# Patient Record
Sex: MALE | Race: WHITE | NOT HISPANIC OR LATINO | Employment: UNEMPLOYED | URBAN - METROPOLITAN AREA
[De-identification: names, ages, dates, MRNs, and addresses within clinical notes are randomized per-mention and may not be internally consistent; named-entity substitution may affect disease eponyms.]

---

## 2024-11-28 ENCOUNTER — HOSPITAL ENCOUNTER (EMERGENCY)
Facility: HOSPITAL | Age: 59
Discharge: HOME | End: 2024-11-29
Attending: STUDENT IN AN ORGANIZED HEALTH CARE EDUCATION/TRAINING PROGRAM
Payer: COMMERCIAL

## 2024-11-28 ENCOUNTER — APPOINTMENT (OUTPATIENT)
Dept: RADIOLOGY | Facility: HOSPITAL | Age: 59
End: 2024-11-28
Payer: COMMERCIAL

## 2024-11-28 ENCOUNTER — HOSPITAL ENCOUNTER (OUTPATIENT)
Dept: CARDIOLOGY | Facility: HOSPITAL | Age: 59
Discharge: HOME | End: 2024-11-28
Payer: COMMERCIAL

## 2024-11-28 DIAGNOSIS — R07.9 CHEST PAIN, UNSPECIFIED TYPE: Primary | ICD-10-CM

## 2024-11-28 LAB
ALBUMIN SERPL BCP-MCNC: 4.2 G/DL (ref 3.4–5)
ALP SERPL-CCNC: 53 U/L (ref 33–120)
ALT SERPL W P-5'-P-CCNC: 8 U/L (ref 10–52)
ANION GAP SERPL CALC-SCNC: 14 MMOL/L (ref 10–20)
APTT PPP: 32 SECONDS (ref 27–38)
AST SERPL W P-5'-P-CCNC: 20 U/L (ref 9–39)
BASOPHILS # BLD AUTO: 0.06 X10*3/UL (ref 0–0.1)
BASOPHILS NFR BLD AUTO: 1 %
BILIRUB SERPL-MCNC: 0.7 MG/DL (ref 0–1.2)
BUN SERPL-MCNC: 17 MG/DL (ref 6–23)
CALCIUM SERPL-MCNC: 8.6 MG/DL (ref 8.6–10.3)
CARDIAC TROPONIN I PNL SERPL HS: 19 NG/L (ref 0–20)
CARDIAC TROPONIN I PNL SERPL HS: 19 NG/L (ref 0–20)
CHLORIDE SERPL-SCNC: 103 MMOL/L (ref 98–107)
CO2 SERPL-SCNC: 25 MMOL/L (ref 21–32)
CREAT SERPL-MCNC: 1.55 MG/DL (ref 0.5–1.3)
D DIMER PPP FEU-MCNC: 783 NG/ML FEU
EGFRCR SERPLBLD CKD-EPI 2021: 51 ML/MIN/1.73M*2
EOSINOPHIL # BLD AUTO: 0.14 X10*3/UL (ref 0–0.7)
EOSINOPHIL NFR BLD AUTO: 2.3 %
ERYTHROCYTE [DISTWIDTH] IN BLOOD BY AUTOMATED COUNT: 12.6 % (ref 11.5–14.5)
GLUCOSE SERPL-MCNC: 121 MG/DL (ref 74–99)
HCT VFR BLD AUTO: 43.2 % (ref 41–52)
HGB BLD-MCNC: 15.4 G/DL (ref 13.5–17.5)
IMM GRANULOCYTES # BLD AUTO: 0.04 X10*3/UL (ref 0–0.7)
IMM GRANULOCYTES NFR BLD AUTO: 0.7 % (ref 0–0.9)
INR PPP: 1 (ref 0.9–1.1)
LYMPHOCYTES # BLD AUTO: 1.45 X10*3/UL (ref 1.2–4.8)
LYMPHOCYTES NFR BLD AUTO: 24 %
MAGNESIUM SERPL-MCNC: 1.91 MG/DL (ref 1.6–2.4)
MCH RBC QN AUTO: 30.9 PG (ref 26–34)
MCHC RBC AUTO-ENTMCNC: 35.6 G/DL (ref 32–36)
MCV RBC AUTO: 87 FL (ref 80–100)
MONOCYTES # BLD AUTO: 0.58 X10*3/UL (ref 0.1–1)
MONOCYTES NFR BLD AUTO: 9.6 %
NEUTROPHILS # BLD AUTO: 3.78 X10*3/UL (ref 1.2–7.7)
NEUTROPHILS NFR BLD AUTO: 62.4 %
NRBC BLD-RTO: 0 /100 WBCS (ref 0–0)
PLATELET # BLD AUTO: 264 X10*3/UL (ref 150–450)
POTASSIUM SERPL-SCNC: 4.1 MMOL/L (ref 3.5–5.3)
PROT SERPL-MCNC: 6.7 G/DL (ref 6.4–8.2)
PROTHROMBIN TIME: 11.5 SECONDS (ref 9.8–12.8)
RBC # BLD AUTO: 4.98 X10*6/UL (ref 4.5–5.9)
SODIUM SERPL-SCNC: 138 MMOL/L (ref 136–145)
WBC # BLD AUTO: 6.1 X10*3/UL (ref 4.4–11.3)

## 2024-11-28 PROCEDURE — 84484 ASSAY OF TROPONIN QUANT: CPT | Performed by: STUDENT IN AN ORGANIZED HEALTH CARE EDUCATION/TRAINING PROGRAM

## 2024-11-28 PROCEDURE — 83735 ASSAY OF MAGNESIUM: CPT | Performed by: STUDENT IN AN ORGANIZED HEALTH CARE EDUCATION/TRAINING PROGRAM

## 2024-11-28 PROCEDURE — 36415 COLL VENOUS BLD VENIPUNCTURE: CPT | Performed by: STUDENT IN AN ORGANIZED HEALTH CARE EDUCATION/TRAINING PROGRAM

## 2024-11-28 PROCEDURE — 85379 FIBRIN DEGRADATION QUANT: CPT | Performed by: STUDENT IN AN ORGANIZED HEALTH CARE EDUCATION/TRAINING PROGRAM

## 2024-11-28 PROCEDURE — 85025 COMPLETE CBC W/AUTO DIFF WBC: CPT | Performed by: STUDENT IN AN ORGANIZED HEALTH CARE EDUCATION/TRAINING PROGRAM

## 2024-11-28 PROCEDURE — 85610 PROTHROMBIN TIME: CPT | Performed by: STUDENT IN AN ORGANIZED HEALTH CARE EDUCATION/TRAINING PROGRAM

## 2024-11-28 PROCEDURE — 80053 COMPREHEN METABOLIC PANEL: CPT | Performed by: STUDENT IN AN ORGANIZED HEALTH CARE EDUCATION/TRAINING PROGRAM

## 2024-11-28 PROCEDURE — 71045 X-RAY EXAM CHEST 1 VIEW: CPT

## 2024-11-28 PROCEDURE — 2500000001 HC RX 250 WO HCPCS SELF ADMINISTERED DRUGS (ALT 637 FOR MEDICARE OP): Performed by: STUDENT IN AN ORGANIZED HEALTH CARE EDUCATION/TRAINING PROGRAM

## 2024-11-28 PROCEDURE — 99285 EMERGENCY DEPT VISIT HI MDM: CPT | Mod: 25

## 2024-11-28 PROCEDURE — 93005 ELECTROCARDIOGRAM TRACING: CPT

## 2024-11-28 PROCEDURE — 71045 X-RAY EXAM CHEST 1 VIEW: CPT | Mod: FOREIGN READ | Performed by: RADIOLOGY

## 2024-11-28 PROCEDURE — 85730 THROMBOPLASTIN TIME PARTIAL: CPT | Performed by: STUDENT IN AN ORGANIZED HEALTH CARE EDUCATION/TRAINING PROGRAM

## 2024-11-28 PROCEDURE — 71275 CT ANGIOGRAPHY CHEST: CPT

## 2024-11-28 RX ORDER — NITROGLYCERIN 0.4 MG/1
0.4 TABLET SUBLINGUAL EVERY 5 MIN PRN
Status: DISCONTINUED | OUTPATIENT
Start: 2024-11-28 | End: 2024-11-29 | Stop reason: HOSPADM

## 2024-11-28 RX ORDER — NAPROXEN SODIUM 220 MG/1
324 TABLET, FILM COATED ORAL ONCE
Status: DISCONTINUED | OUTPATIENT
Start: 2024-11-28 | End: 2024-11-28

## 2024-11-28 RX ADMIN — NITROGLYCERIN 0.4 MG: 0.4 TABLET SUBLINGUAL at 21:51

## 2024-11-28 ASSESSMENT — PAIN SCALES - GENERAL
PAINLEVEL_OUTOF10: 6
PAINLEVEL_OUTOF10: 5 - MODERATE PAIN

## 2024-11-28 ASSESSMENT — LIFESTYLE VARIABLES
EVER HAD A DRINK FIRST THING IN THE MORNING TO STEADY YOUR NERVES TO GET RID OF A HANGOVER: NO
HAVE PEOPLE ANNOYED YOU BY CRITICIZING YOUR DRINKING: NO
EVER FELT BAD OR GUILTY ABOUT YOUR DRINKING: NO
HAVE YOU EVER FELT YOU SHOULD CUT DOWN ON YOUR DRINKING: NO
TOTAL SCORE: 0

## 2024-11-28 ASSESSMENT — COLUMBIA-SUICIDE SEVERITY RATING SCALE - C-SSRS
6. HAVE YOU EVER DONE ANYTHING, STARTED TO DO ANYTHING, OR PREPARED TO DO ANYTHING TO END YOUR LIFE?: NO
2. HAVE YOU ACTUALLY HAD ANY THOUGHTS OF KILLING YOURSELF?: NO
1. IN THE PAST MONTH, HAVE YOU WISHED YOU WERE DEAD OR WISHED YOU COULD GO TO SLEEP AND NOT WAKE UP?: NO

## 2024-11-28 ASSESSMENT — PAIN DESCRIPTION - PAIN TYPE: TYPE: ACUTE PAIN

## 2024-11-28 ASSESSMENT — PAIN DESCRIPTION - DESCRIPTORS: DESCRIPTORS: SQUEEZING

## 2024-11-28 ASSESSMENT — PAIN DESCRIPTION - LOCATION: LOCATION: CHEST

## 2024-11-28 ASSESSMENT — PAIN - FUNCTIONAL ASSESSMENT: PAIN_FUNCTIONAL_ASSESSMENT: 0-10

## 2024-11-29 ENCOUNTER — APPOINTMENT (OUTPATIENT)
Dept: CARDIOLOGY | Facility: HOSPITAL | Age: 59
End: 2024-11-29
Payer: COMMERCIAL

## 2024-11-29 VITALS
RESPIRATION RATE: 18 BRPM | BODY MASS INDEX: 28.31 KG/M2 | OXYGEN SATURATION: 96 % | HEIGHT: 72 IN | SYSTOLIC BLOOD PRESSURE: 136 MMHG | WEIGHT: 209 LBS | DIASTOLIC BLOOD PRESSURE: 78 MMHG | TEMPERATURE: 97.2 F | HEART RATE: 61 BPM

## 2024-11-29 LAB
ATRIAL RATE: 63 BPM
P AXIS: 60 DEGREES
P OFFSET: 183 MS
P ONSET: 141 MS
PR INTERVAL: 158 MS
Q ONSET: 220 MS
QRS COUNT: 10 BEATS
QRS DURATION: 94 MS
QT INTERVAL: 392 MS
QTC CALCULATION(BAZETT): 401 MS
QTC FREDERICIA: 398 MS
R AXIS: 4 DEGREES
T AXIS: -36 DEGREES
T OFFSET: 416 MS
VENTRICULAR RATE: 63 BPM

## 2024-11-29 PROCEDURE — 2550000001 HC RX 255 CONTRASTS: Performed by: STUDENT IN AN ORGANIZED HEALTH CARE EDUCATION/TRAINING PROGRAM

## 2024-11-29 PROCEDURE — 71275 CT ANGIOGRAPHY CHEST: CPT | Performed by: SURGERY

## 2024-11-29 RX ADMIN — IOHEXOL 75 ML: 350 INJECTION, SOLUTION INTRAVENOUS at 00:11

## 2024-11-29 ASSESSMENT — HEART SCORE
RISK FACTORS: 1-2 RISK FACTORS
ECG: NON-SPECIFIC REPOLARIZATION DISTURBANCE
HISTORY: SLIGHTLY SUSPICIOUS
HEART SCORE: 3
TROPONIN: LESS THAN OR EQUAL TO NORMAL LIMIT
AGE: 45-64

## 2024-11-29 NOTE — ED PROVIDER NOTES
HPI   Chief Complaint   Patient presents with    Chest Pain    Hypertension     PT. ARRIVED VIA PRIVATE CAR, RIDE PROVIDED, TO ED FROM HOME FOR CP/HTN. PT. STATES X2WKS AGO, BROUGHT MOTHER IN IF CP, HAS FELT ANXIOUS SINCE. PT. STATES HX OF ANXIETY. PT. STATES CP STARTED X2WK AGO IS LT SIDED, RADIATES DOWN LT ARM, STARTED TODAY WHILE WALKING AROUND GARAGE, TOOK BABY ASA, W/ SOME RELIEF. PT. STATES SOB, AND LIGHT HEADEDNESS W/ CP. PT. EKG READING W/ HR OF 65. PT. BREATHING UNLABORED, SPEAKING IN FULL SENTENCES, B/L LUNG SOUNDS CLEAR, PULSE OX READING 96% ON RA. PT. IS HYPERTENSIVE IN       The patient is a 59-year-old male with past medical history as below who presents today for evaluation of chest pain.  Located on the left side.  Coming and going now for 2 weeks severe time radiates down the left arm.  No fevers nausea vomiting no personal history of MI or CVA.  Non-smoker.  Remote history of smoking over 10 years ago.  No diagnosed hypertension hyperlipidemia or diabetes.              Patient History   Past Medical History:   Diagnosis Date    Other specified health status     No pertinent past medical history     Past Surgical History:   Procedure Laterality Date    CT ANGIO NECK  11/23/2022    CT NECK ANGIO W AND WO IV CONTRAST 11/23/2022 [facility]CTOR [facility]     No family history on file.  Social History     Tobacco Use    Smoking status: Former     Types: Cigarettes    Smokeless tobacco: Never   Vaping Use    Vaping status: Never Used   Substance Use Topics    Alcohol use: Never    Drug use: Never       Physical Exam   ED Triage Vitals [11/28/24 2101]   Temperature Heart Rate Respirations BP   36.2 °C (97.2 °F) 69 20 (!) 234/109      Pulse Ox Temp Source Heart Rate Source Patient Position   96 % Temporal Monitor Sitting      BP Location FiO2 (%)     Right arm --       Physical Exam  Vitals and nursing note reviewed.   Constitutional:       Appearance: Normal appearance.   HENT:      Head: Normocephalic and  atraumatic.      Right Ear: External ear normal.      Left Ear: External ear normal.      Nose: Nose normal.      Mouth/Throat:      Mouth: Mucous membranes are moist.   Cardiovascular:      Rate and Rhythm: Normal rate and regular rhythm.      Pulses: Normal pulses.           Radial pulses are 2+ on the right side and 2+ on the left side.      Heart sounds: Normal heart sounds.   Pulmonary:      Effort: Pulmonary effort is normal.      Breath sounds: Normal breath sounds.   Abdominal:      General: Abdomen is flat. There is no distension.      Palpations: Abdomen is soft.      Tenderness: There is no abdominal tenderness. There is no guarding or rebound.   Musculoskeletal:         General: No deformity.   Skin:     General: Skin is warm.   Neurological:      General: No focal deficit present.      Mental Status: He is alert and oriented to person, place, and time. Mental status is at baseline.           ED Course & [facility]   ED Course as of 11/29/24 0344   Thu Nov 28, 2024 2125 ECG 12 lead  EKG shows normal sinus rhythm with ST depression in 2 3 aVF with some inversion in 3 and aVF some lateral depression as well.  More pronounced than previous EKG [SE]      ED Course User Index  [SE] Refugio Tobias MD         Diagnoses as of 11/29/24 0344   Chest pain, unspecified type                 No data recorded     Keven Coma Scale Score: 15 (11/28/24 2208 : Rupali Voss RN) HEART Score: 3 (11/29/24 0339 : Refugio Tobias MD)                         Medical Decision Making  Patient presents for evaluation of chest pain.  Labs and EKG were obtained.  EKG appears similar to prior.  Labs showed flat troponins.  Both negative.  CTA of the chest does not reveal any blood clots or any other acute findings.  These findings were discussed with the patient need for further evaluation and management.  Recommend cardiology follow-up.  Heart score 3    I completed a HEART Score to screen for Major Adverse Cardiac Event (MACE) in this  patient. The evidence indicates that the patient is low risk for MACE and this is consistent with my clinical intuition.  The risk of further workup or hospitalization for MACE is likely higher than the risk of the patient having a MACE. It is, therefore, in the patient's best interest not to do additional emergent testing or to be hospitalized for MACE.  I have discussed with the patient my clinical impression and the result of the HEART Score to screen for MACE, as well as the  risks of further testing and hospitalization. The HEART Score shows that the risk for MACE is less than 2%      Amount and/or Complexity of Data Reviewed  Labs: ordered.  Radiology: ordered.  ECG/medicine tests: ordered and independent interpretation performed.    Risk  Prescription drug management.  Decision regarding hospitalization.        Procedure  Procedures     Refugio Tobias MD  11/29/24 0344

## 2024-12-09 ENCOUNTER — OFFICE VISIT (OUTPATIENT)
Dept: CARDIOLOGY | Facility: CLINIC | Age: 59
End: 2024-12-09
Payer: COMMERCIAL

## 2024-12-09 VITALS
WEIGHT: 212 LBS | DIASTOLIC BLOOD PRESSURE: 85 MMHG | BODY MASS INDEX: 28.71 KG/M2 | SYSTOLIC BLOOD PRESSURE: 134 MMHG | OXYGEN SATURATION: 98 % | HEART RATE: 69 BPM | HEIGHT: 72 IN

## 2024-12-09 DIAGNOSIS — I25.118 CORONARY ARTERY DISEASE INVOLVING NATIVE CORONARY ARTERY OF NATIVE HEART WITH OTHER FORM OF ANGINA PECTORIS: ICD-10-CM

## 2024-12-09 DIAGNOSIS — R73.9 BLOOD GLUCOSE ELEVATED: Primary | ICD-10-CM

## 2024-12-09 DIAGNOSIS — R07.9 CHEST PAIN, UNSPECIFIED TYPE: ICD-10-CM

## 2024-12-09 PROBLEM — I25.10 ATHEROSCLEROSIS OF NATIVE CORONARY ARTERY OF NATIVE HEART: Status: ACTIVE | Noted: 2024-12-09

## 2024-12-09 PROBLEM — I25.10 CORONARY ARTERY DISEASE INVOLVING NATIVE CORONARY ARTERY OF NATIVE HEART: Status: ACTIVE | Noted: 2024-12-09

## 2024-12-09 PROCEDURE — 3008F BODY MASS INDEX DOCD: CPT | Performed by: INTERNAL MEDICINE

## 2024-12-09 PROCEDURE — 1036F TOBACCO NON-USER: CPT | Performed by: INTERNAL MEDICINE

## 2024-12-09 PROCEDURE — 93005 ELECTROCARDIOGRAM TRACING: CPT | Performed by: INTERNAL MEDICINE

## 2024-12-09 PROCEDURE — 99214 OFFICE O/P EST MOD 30 MIN: CPT | Performed by: INTERNAL MEDICINE

## 2024-12-09 PROCEDURE — 93010 ELECTROCARDIOGRAM REPORT: CPT | Performed by: INTERNAL MEDICINE

## 2024-12-09 PROCEDURE — 99204 OFFICE O/P NEW MOD 45 MIN: CPT | Performed by: INTERNAL MEDICINE

## 2024-12-09 RX ORDER — OMEPRAZOLE 20 MG/1
20 CAPSULE, DELAYED RELEASE ORAL
COMMUNITY

## 2024-12-09 ASSESSMENT — PAIN SCALES - GENERAL: PAINLEVEL_OUTOF10: 7

## 2024-12-09 NOTE — PROGRESS NOTES
Name : Gopal Rivas    : 1965   MRN : [id number]   ENC Date : 24     Reason for visit: Chest pain/CAD    Assessment and Plan:  Coronary artery disease: Patient has remote history of mild CAD.  Symptoms seem more consistent with anxiety but he has multiple risk factors for worsening CAD including untreated lipids, tobacco history and family history of atherosclerosis.  EKG is subtly abnormal with inferior and lateral ST and T wave abnormalities.  I consider going directly to a coronary CTA but given his waxing and waning renal function I do not think IV contrast would be wise at this point without rechecking renal function.  Therefore we will start with a stress echocardiogram.  This has the advantage of no IV contrast and we also will be able to assess LV function.  He briefly took atorvastatin in the past but had some atypical side effects.  Will readdress statin after we get repeat lipid panel high-sensitivity CRP and LP(a) laboratory so we can review all of this and make a better case for starting statin therapy.  His coronary calcification alone and known CAD would be an indication for statin therapy.  Will hold off until we get the lab work back to have a good baseline to work from.  Elevated blood glucose: Patient will be establishing with primary care probably March of next year.  Will order hemoglobin A1c in the interim since we are already getting blood work.  Former tobacco use: Congratulated patient on discontinuing smoking almost 11 years ago now.  Elevated blood pressure without diagnosis of hypertension: Patient blood pressure was greatly elevated at the time of his emergency room visit.  It probably was situational in nature.  It sounds as if he has had anxiety related elevated blood pressures in the past.  This may explain some of his elevated creatinine however.  Blood pressure was acceptable today.  Will repeat at the time of his stress test.  CKD: As described in HPI waxing and waning  renal insufficiency.  Will repeat creatinine with lab work as described above.  Disp: Determine follow-up after laboratories and stress test      HPI:  Patient seen by myself for the first time today.  He apparently has a history of mild coronary disease having had a cardiac catheterization approximately 20 years ago showing a 40% blockage of an unknown vessel.  It was ultimately determined he was having a severe panic attack at the time.  In the intervening years he has not had consistent medical follow-up if at all.  He was a former smoker but quit approximately 11 years ago.  He was seen at [place] 2 years ago for atypical chest pain.  Cardiology saw him and recommended no further testing.  He was then seen at [facility] approximately 2 weeks ago for another episode of chest discomfort.  Interestingly he stated that he took a aspirin and within a few minutes he states his symptoms improved by 80%.  Cardiac enzymes were negative.  EKG reportedly showed ST segment depression in leads II, III and aVF.  CTA of the chest was negative for pulmonary embolism but did show moderate coronary calcification.  He was ultimately discharged home.  Blood pressure was extremely elevated at 234/109.  He was not sent home on blood pressure medication.  His blood pressure is documented as coming down to 136/78 prior to discharge.  I do not believe he was given any medication specifically to treat the hypertension.  In reviewing his laboratories his creatinine was elevated at 1.55.  His creatinine has gone up and down in the past in 2019 being normal at 0.92 and in August 2022 elevated to 1.68 and then again normalized in November 2022 at 1.0.  Patient was unaware of any of these lab values or if he had any prior kidney issues.      Problem List:   Patient Active Problem List   Diagnosis    Atherosclerosis of native coronary artery of native heart    Blood glucose elevated    Coronary artery disease involving native coronary artery of  native heart        Meds:   Current Outpatient Medications on File Prior to Visit   Medication Sig Dispense Refill    omeprazole (PriLOSEC) 20 mg DR capsule Take 1 capsule (20 mg) by mouth once daily in the morning. Take before meals.       No current facility-administered medications on file prior to visit.       All: No Known Allergies    Fam Hx:   Family History   Problem Relation Name Age of Onset    Stroke Mother      Coronary artery disease Father         Soc Hx:   Social History     Socioeconomic History    Marital status:      Spouse name: Not on file    Number of children: Not on file    Years of education: Not on file    Highest education level: Not on file   Occupational History    Not on file   Tobacco Use    Smoking status: Former     Types: Cigarettes    Smokeless tobacco: Never   Vaping Use    Vaping status: Never Used   Substance and Sexual Activity    Alcohol use: Yes     Alcohol/week: 5.0 standard drinks of alcohol     Types: 5 Cans of beer per week    Drug use: Not Currently     Types: Marijuana    Sexual activity: Not on file   Other Topics Concern    Not on file   Social History Narrative    Not on file     Social Drivers of Health     Financial Resource Strain: Not on file   Food Insecurity: Not on file   Transportation Needs: Not on file   Physical Activity: Not on file   Stress: Not on file   Social Connections: Not on file   Intimate Partner Violence: Not on file   Housing Stability: Not on file       VS: /85 (BP Location: Right arm, Patient Position: Sitting, BP Cuff Size: Adult)   Pulse 69   Ht 1.829 m (6')   Wt 96.2 kg (212 lb)   SpO2 98%   BMI 28.75 kg/m²      Physical Exam  Vitals reviewed.   Constitutional:       Appearance: Normal appearance.   Eyes:      Pupils: Pupils are equal, round, and reactive to light.   Neck:      Vascular: No JVD.   Cardiovascular:      Rate and Rhythm: Normal rate and regular rhythm.      Pulses: Normal pulses.      Heart sounds: No murmur  heard.     No gallop.   Pulmonary:      Effort: No respiratory distress.      Breath sounds: No wheezing or rales.   Abdominal:      General: Abdomen is flat. There is no distension.      Palpations: Abdomen is soft.   Musculoskeletal:         General: No swelling.      Right lower leg: No edema.      Left lower leg: No edema.   Neurological:      General: No focal deficit present.      Mental Status: He is alert.   Psychiatric:         Mood and Affect: Mood normal.            Encounter Date: 24   ECG 12 lead   Result Value    Ventricular Rate 63    Atrial Rate 63    SD Interval 158    QRS Duration 94    QT Interval 392    QTC Calculation(Bazett) 401    P Axis 60    R Axis 4    T Axis -36    QRS Count 10    Q Onset 220    P Onset 141    P Offset 183    T Offset 416    QTC Fredericia 398    Narrative    Normal sinus rhythm  ST & T wave abnormality, consider inferior ischemia  Abnormal ECG  When compared with ECG of 2022 05:11,  Previous ECG has undetermined rhythm, needs review  See ED provider note for full interpretation and clinical correlation  Confirmed by Magdalene Lockhart ([id number]) on 2024 2:51:33 PM      EC.24: Normal sinus rhythm.  Nonspecific ST and T wave abnormalities inferolateral      Juan Lutz MD

## 2024-12-10 ENCOUNTER — LAB (OUTPATIENT)
Dept: LAB | Facility: LAB | Age: 59
End: 2024-12-10
Payer: COMMERCIAL

## 2024-12-10 DIAGNOSIS — I25.118 CORONARY ARTERY DISEASE INVOLVING NATIVE CORONARY ARTERY OF NATIVE HEART WITH OTHER FORM OF ANGINA PECTORIS: ICD-10-CM

## 2024-12-10 DIAGNOSIS — R73.9 BLOOD GLUCOSE ELEVATED: ICD-10-CM

## 2024-12-10 DIAGNOSIS — R07.9 CHEST PAIN, UNSPECIFIED TYPE: ICD-10-CM

## 2024-12-10 LAB
ALBUMIN SERPL BCP-MCNC: 4.4 G/DL (ref 3.4–5)
ALP SERPL-CCNC: 61 U/L (ref 33–120)
ALT SERPL W P-5'-P-CCNC: 11 U/L (ref 10–52)
ANION GAP SERPL CALC-SCNC: 14 MMOL/L (ref 10–20)
AST SERPL W P-5'-P-CCNC: 20 U/L (ref 9–39)
BILIRUB SERPL-MCNC: 1.1 MG/DL (ref 0–1.2)
BUN SERPL-MCNC: 12 MG/DL (ref 6–23)
CALCIUM SERPL-MCNC: 9.5 MG/DL (ref 8.6–10.6)
CHLORIDE SERPL-SCNC: 103 MMOL/L (ref 98–107)
CHOLEST SERPL-MCNC: 236 MG/DL (ref 0–199)
CHOLESTEROL/HDL RATIO: 5.5
CO2 SERPL-SCNC: 30 MMOL/L (ref 21–32)
CREAT SERPL-MCNC: 1.04 MG/DL (ref 0.5–1.3)
EGFRCR SERPLBLD CKD-EPI 2021: 83 ML/MIN/1.73M*2
EST. AVERAGE GLUCOSE BLD GHB EST-MCNC: 91 MG/DL
GLUCOSE SERPL-MCNC: 105 MG/DL (ref 74–99)
HBA1C MFR BLD: 4.8 %
HDLC SERPL-MCNC: 42.8 MG/DL
LDLC SERPL CALC-MCNC: 149 MG/DL
NON HDL CHOLESTEROL: 193 MG/DL (ref 0–149)
POTASSIUM SERPL-SCNC: 4.5 MMOL/L (ref 3.5–5.3)
PROT SERPL-MCNC: 6.7 G/DL (ref 6.4–8.2)
SODIUM SERPL-SCNC: 142 MMOL/L (ref 136–145)
TRIGL SERPL-MCNC: 221 MG/DL (ref 0–149)
VLDL: 44 MG/DL (ref 0–40)

## 2024-12-10 PROCEDURE — 36415 COLL VENOUS BLD VENIPUNCTURE: CPT

## 2024-12-10 PROCEDURE — 83036 HEMOGLOBIN GLYCOSYLATED A1C: CPT

## 2024-12-10 PROCEDURE — 80053 COMPREHEN METABOLIC PANEL: CPT

## 2024-12-10 PROCEDURE — 82172 ASSAY OF APOLIPOPROTEIN: CPT

## 2024-12-10 PROCEDURE — 80061 LIPID PANEL: CPT

## 2024-12-11 ENCOUNTER — TELEPHONE (OUTPATIENT)
Dept: CARDIOLOGY | Facility: HOSPITAL | Age: 59
End: 2024-12-11
Payer: COMMERCIAL

## 2024-12-11 DIAGNOSIS — E78.5 HYPERLIPIDEMIA, UNSPECIFIED HYPERLIPIDEMIA TYPE: Primary | ICD-10-CM

## 2024-12-11 RX ORDER — ROSUVASTATIN CALCIUM 20 MG/1
20 TABLET, COATED ORAL DAILY
Qty: 90 TABLET | Refills: 3 | Status: SHIPPED | OUTPATIENT
Start: 2024-12-11 | End: 2025-12-11

## 2024-12-11 NOTE — TELEPHONE ENCOUNTER
Result Communication    Resulted Orders   Lipid panel   Result Value Ref Range    Cholesterol 236 (H) 0 - 199 mg/dL      Comment:            Age      Desirable   Borderline High   High     0-19 Y     0 - 169       170 - 199     >/= 200    20-24 Y     0 - 189       190 - 224     >/= 225         >24 Y     0 - 199       200 - 239     >/= 240   **All ranges are based on fasting samples. Specific   therapeutic targets will vary based on patient-specific   cardiac risk.    Pediatric guidelines reference:Pediatrics 2011, 128(S5).Adult guidelines reference: NCEP ATPIII Guidelines,JOHNNY 2001, 258:2486-97    Venipuncture immediately after or during the administration of Metamizole may lead to falsely low results. Testing should be performed immediately prior to Metamizole dosing.    HDL-Cholesterol 42.8 mg/dL      Comment:        Age       Very Low   Low     Normal    High    0-19 Y    < 35      < 40     40-45     ----  20-24 Y    ----     < 40      >45      ----        >24 Y      ----     < 40     40-60      >60      Cholesterol/HDL Ratio 5.5       Comment:        Ref Values  Desirable  < 3.4  High Risk  > 5.0    LDL Calculated 149 (H) <=99 mg/dL      Comment:                                  Near   Borderline      AGE      Desirable  Optimal    High     High     Very High     0-19 Y     0 - 109     ---    110-129   >/= 130     ----    20-24 Y     0 - 119     ---    120-159   >/= 160     ----      >24 Y     0 -  99   100-129  130-159   160-189     >/=190      VLDL 44 (H) 0 - 40 mg/dL    Triglycerides 221 (H) 0 - 149 mg/dL      Comment:      Age              Desirable        Borderline         High        Very High  SEX:B           mg/dL             mg/dL               mg/dL      mg/dL  <=14D                       ----               ----        ----  15D-365D                    ----               ----        ----  1Y-9Y           0-74               75-99             >=100       ----  10Y-19Y        0-89                             >=130       ----  20Y-24Y        0-114             115-149             >=150      ----  >= 25Y         0-149             150-199             200-499    >=500      Venipuncture immediately after or during the administration of Metamizole may lead to falsely low results. Testing should be performed immediately prior to Metamizole dosing.    Non HDL Cholesterol 193 (H) 0 - 149 mg/dL      Comment:            Age       Desirable   Borderline High   High     Very High     0-19 Y     0 - 119       120 - 144     >/= 145    >/= 160    20-24 Y     0 - 149       150 - 189     >/= 190      ----         >24 Y    30 mg/dL above LDL Cholesterol goal     Comprehensive metabolic panel   Result Value Ref Range    Glucose 105 (H) 74 - 99 mg/dL    Sodium 142 136 - 145 mmol/L    Potassium 4.5 3.5 - 5.3 mmol/L    Chloride 103 98 - 107 mmol/L    Bicarbonate 30 21 - 32 mmol/L    Anion Gap 14 10 - 20 mmol/L    Urea Nitrogen 12 6 - 23 mg/dL    Creatinine 1.04 0.50 - 1.30 mg/dL    eGFR 83 >60 mL/min/1.73m*2      Comment:      Calculations of estimated GFR are performed using the 2021 CKD-EPI Study Refit equation without the race variable for the IDMS-Traceable creatinine methods.  https://jasn.asnjournals.org/content/early/2021/09/22/ASN.9195341398    Calcium 9.5 8.6 - 10.6 mg/dL    Albumin 4.4 3.4 - 5.0 g/dL    Alkaline Phosphatase 61 33 - 120 U/L    Total Protein 6.7 6.4 - 8.2 g/dL    AST 20 9 - 39 U/L    Bilirubin, Total 1.1 0.0 - 1.2 mg/dL    ALT 11 10 - 52 U/L      Comment:      Patients treated with Sulfasalazine may generate falsely decreased results for ALT.   Hemoglobin A1C   Result Value Ref Range    Hemoglobin A1C 4.8 See comment %    Estimated Average Glucose 91 Not Established mg/dL    Narrative    Diagnosis of Diabetes-Adults  Non-Diabetic: < or = 5.6%  Increased risk for developing diabetes: 5.7-6.4%  Diagnostic of diabetes: > or = 6.5%           11:20 AM      Results were successfully communicated with the  patient and they acknowledged their understanding.

## 2024-12-11 NOTE — TELEPHONE ENCOUNTER
Please let pt know mostly good labs    His blood sugar and A1c are normal. No signs of diabetes  His kidney fxn is again back to normal.     His cholesterol is not good. I would recommend he try rosuvastaitn 20mg daily.  It shoudl be better tolerated than the lipitor he tried in the past.  If he si ok with that please pend rx      SDH

## 2024-12-13 LAB — LPA SERPL-MCNC: 132 MG/DL

## 2024-12-18 ENCOUNTER — HOSPITAL ENCOUNTER (OUTPATIENT)
Dept: CARDIOLOGY | Facility: HOSPITAL | Age: 59
Discharge: HOME | End: 2024-12-18
Payer: COMMERCIAL

## 2024-12-18 DIAGNOSIS — R73.9 BLOOD GLUCOSE ELEVATED: ICD-10-CM

## 2024-12-18 DIAGNOSIS — R07.9 CHEST PAIN, UNSPECIFIED TYPE: ICD-10-CM

## 2024-12-18 DIAGNOSIS — I25.118 CORONARY ARTERY DISEASE INVOLVING NATIVE CORONARY ARTERY OF NATIVE HEART WITH OTHER FORM OF ANGINA PECTORIS: ICD-10-CM

## 2024-12-18 PROCEDURE — 93017 CV STRESS TEST TRACING ONLY: CPT

## 2024-12-18 PROCEDURE — 93350 STRESS TTE ONLY: CPT | Performed by: INTERNAL MEDICINE

## 2024-12-18 PROCEDURE — 93018 CV STRESS TEST I&R ONLY: CPT | Performed by: INTERNAL MEDICINE

## 2024-12-18 PROCEDURE — 93016 CV STRESS TEST SUPVJ ONLY: CPT | Performed by: INTERNAL MEDICINE

## 2024-12-23 ENCOUNTER — TELEPHONE (OUTPATIENT)
Dept: CARDIOLOGY | Facility: HOSPITAL | Age: 59
End: 2024-12-23
Payer: COMMERCIAL

## 2024-12-23 DIAGNOSIS — R07.2 PRECORDIAL PAIN: ICD-10-CM

## 2024-12-23 DIAGNOSIS — R94.39 ABNORMAL STRESS ECG: Primary | ICD-10-CM

## 2024-12-23 RX ORDER — LISINOPRIL 5 MG/1
5 TABLET ORAL DAILY
Qty: 30 TABLET | Refills: 11 | Status: SHIPPED | OUTPATIENT
Start: 2024-12-23 | End: 2025-12-23

## 2024-12-23 RX ORDER — ASPIRIN 81 MG/1
81 TABLET ORAL DAILY
Qty: 30 TABLET | Refills: 11 | Status: SHIPPED | OUTPATIENT
Start: 2024-12-23 | End: 2025-12-23

## 2024-12-23 NOTE — TELEPHONE ENCOUNTER
I tried calling patient to review his stress test but got voicemail.    1.  Please let him know his stress test is abnormal and I would like to go over it with him.  2.  In the interim please have him start aspirin 81 mg daily.  3.  His blood pressure is not adequately controlled.  Recommend we start lisinopril 10 mg daily for better blood pressure control.  If he is okay with that please pend Rx.      SDH

## 2024-12-23 NOTE — TELEPHONE ENCOUNTER
Patient called back and I spoke to him about his stress test.  Patient is agreeable to aspirin and lisinopril.  He like to try a slightly lower dose to see if he can tolerate it as his blood pressures have only been high normal at home.  This is reasonable.  Rx for both was sent  Patient was agreeable to cardiac catheterization.  The risks and benefits were discussed in detail.    Please schedule left heart cath with Dr. Rivera sometime in the next few weeks.  No urgency.  Patient should have early admission for hydration given his intermittent elevated creatinine.    SDH

## 2025-01-14 ENCOUNTER — PHARMACY VISIT (OUTPATIENT)
Dept: PHARMACY | Facility: CLINIC | Age: 60
End: 2025-01-14
Payer: COMMERCIAL

## 2025-01-14 ENCOUNTER — HOSPITAL ENCOUNTER (OUTPATIENT)
Facility: HOSPITAL | Age: 60
Setting detail: OUTPATIENT SURGERY
Discharge: HOME | End: 2025-01-14
Attending: INTERNAL MEDICINE | Admitting: INTERNAL MEDICINE
Payer: COMMERCIAL

## 2025-01-14 ENCOUNTER — APPOINTMENT (OUTPATIENT)
Dept: CARDIOLOGY | Facility: HOSPITAL | Age: 60
End: 2025-01-14
Payer: COMMERCIAL

## 2025-01-14 VITALS
HEART RATE: 70 BPM | TEMPERATURE: 97.9 F | HEIGHT: 72 IN | SYSTOLIC BLOOD PRESSURE: 150 MMHG | RESPIRATION RATE: 16 BRPM | WEIGHT: 212 LBS | DIASTOLIC BLOOD PRESSURE: 80 MMHG | BODY MASS INDEX: 28.71 KG/M2 | OXYGEN SATURATION: 97 %

## 2025-01-14 DIAGNOSIS — R07.2 PRECORDIAL PAIN: ICD-10-CM

## 2025-01-14 DIAGNOSIS — I20.9 ANGINA PECTORIS, UNSPECIFIED: ICD-10-CM

## 2025-01-14 DIAGNOSIS — R93.1 ABNORMAL FINDINGS ON DIAGNOSTIC IMAGING OF HEART AND CORONARY CIRCULATION: ICD-10-CM

## 2025-01-14 DIAGNOSIS — I25.10 CORONARY ARTERY DISEASE INVOLVING NATIVE CORONARY ARTERY OF NATIVE HEART: Primary | ICD-10-CM

## 2025-01-14 DIAGNOSIS — R94.39 ABNORMAL STRESS ECG: ICD-10-CM

## 2025-01-14 LAB
ACT BLD: 263 SEC (ref 83–199)
ANION GAP SERPL CALC-SCNC: 12 MMOL/L (ref 10–20)
ATRIAL RATE: 56 BPM
BUN SERPL-MCNC: 16 MG/DL (ref 6–23)
CALCIUM SERPL-MCNC: 9.1 MG/DL (ref 8.6–10.3)
CHLORIDE SERPL-SCNC: 103 MMOL/L (ref 98–107)
CO2 SERPL-SCNC: 28 MMOL/L (ref 21–32)
CREAT SERPL-MCNC: 1.15 MG/DL (ref 0.5–1.3)
EGFRCR SERPLBLD CKD-EPI 2021: 73 ML/MIN/1.73M*2
ERYTHROCYTE [DISTWIDTH] IN BLOOD BY AUTOMATED COUNT: 12.8 % (ref 11.5–14.5)
GLUCOSE SERPL-MCNC: 113 MG/DL (ref 74–99)
HCT VFR BLD AUTO: 41.6 % (ref 41–52)
HGB BLD-MCNC: 14.3 G/DL (ref 13.5–17.5)
MCH RBC QN AUTO: 29.1 PG (ref 26–34)
MCHC RBC AUTO-ENTMCNC: 34.4 G/DL (ref 32–36)
MCV RBC AUTO: 85 FL (ref 80–100)
NRBC BLD-RTO: 0 /100 WBCS (ref 0–0)
P AXIS: 15 DEGREES
P OFFSET: 192 MS
P ONSET: 140 MS
PLATELET # BLD AUTO: 237 X10*3/UL (ref 150–450)
POTASSIUM SERPL-SCNC: 3.9 MMOL/L (ref 3.5–5.3)
PR INTERVAL: 158 MS
Q ONSET: 219 MS
QRS COUNT: 9 BEATS
QRS DURATION: 96 MS
QT INTERVAL: 418 MS
QTC CALCULATION(BAZETT): 403 MS
QTC FREDERICIA: 408 MS
R AXIS: 0 DEGREES
RBC # BLD AUTO: 4.91 X10*6/UL (ref 4.5–5.9)
SODIUM SERPL-SCNC: 139 MMOL/L (ref 136–145)
T AXIS: -36 DEGREES
T OFFSET: 428 MS
VENTRICULAR RATE: 56 BPM
WBC # BLD AUTO: 5.5 X10*3/UL (ref 4.4–11.3)

## 2025-01-14 PROCEDURE — 7100000009 HC PHASE TWO TIME - INITIAL BASE CHARGE: Performed by: INTERNAL MEDICINE

## 2025-01-14 PROCEDURE — 93005 ELECTROCARDIOGRAM TRACING: CPT | Mod: 59

## 2025-01-14 PROCEDURE — C1769 GUIDE WIRE: HCPCS | Performed by: INTERNAL MEDICINE

## 2025-01-14 PROCEDURE — C1894 INTRO/SHEATH, NON-LASER: HCPCS | Performed by: INTERNAL MEDICINE

## 2025-01-14 PROCEDURE — 99152 MOD SED SAME PHYS/QHP 5/>YRS: CPT | Performed by: INTERNAL MEDICINE

## 2025-01-14 PROCEDURE — 93458 L HRT ARTERY/VENTRICLE ANGIO: CPT | Performed by: INTERNAL MEDICINE

## 2025-01-14 PROCEDURE — 85027 COMPLETE CBC AUTOMATED: CPT | Performed by: INTERNAL MEDICINE

## 2025-01-14 PROCEDURE — C1874 STENT, COATED/COV W/DEL SYS: HCPCS | Performed by: INTERNAL MEDICINE

## 2025-01-14 PROCEDURE — 2500000004 HC RX 250 GENERAL PHARMACY W/ HCPCS (ALT 636 FOR OP/ED): Performed by: INTERNAL MEDICINE

## 2025-01-14 PROCEDURE — 2500000004 HC RX 250 GENERAL PHARMACY W/ HCPCS (ALT 636 FOR OP/ED): Performed by: NURSE PRACTITIONER

## 2025-01-14 PROCEDURE — 2780000003 HC OR 278 NO HCPCS: Performed by: INTERNAL MEDICINE

## 2025-01-14 PROCEDURE — 85347 COAGULATION TIME ACTIVATED: CPT

## 2025-01-14 PROCEDURE — C1887 CATHETER, GUIDING: HCPCS | Performed by: INTERNAL MEDICINE

## 2025-01-14 PROCEDURE — 99153 MOD SED SAME PHYS/QHP EA: CPT | Performed by: INTERNAL MEDICINE

## 2025-01-14 PROCEDURE — 2720000007 HC OR 272 NO HCPCS: Performed by: INTERNAL MEDICINE

## 2025-01-14 PROCEDURE — 93010 ELECTROCARDIOGRAM REPORT: CPT | Performed by: INTERNAL MEDICINE

## 2025-01-14 PROCEDURE — 2500000002 HC RX 250 W HCPCS SELF ADMINISTERED DRUGS (ALT 637 FOR MEDICARE OP, ALT 636 FOR OP/ED): Performed by: INTERNAL MEDICINE

## 2025-01-14 PROCEDURE — 7100000010 HC PHASE TWO TIME - EACH INCREMENTAL 1 MINUTE: Performed by: INTERNAL MEDICINE

## 2025-01-14 PROCEDURE — 92928 PRQ TCAT PLMT NTRAC ST 1 LES: CPT | Performed by: INTERNAL MEDICINE

## 2025-01-14 PROCEDURE — 2550000001 HC RX 255 CONTRASTS: Performed by: INTERNAL MEDICINE

## 2025-01-14 PROCEDURE — C1725 CATH, TRANSLUMIN NON-LASER: HCPCS | Performed by: INTERNAL MEDICINE

## 2025-01-14 PROCEDURE — C9600 PERC DRUG-EL COR STENT SING: HCPCS | Mod: LC | Performed by: INTERNAL MEDICINE

## 2025-01-14 PROCEDURE — 80048 BASIC METABOLIC PNL TOTAL CA: CPT | Performed by: INTERNAL MEDICINE

## 2025-01-14 PROCEDURE — C1760 CLOSURE DEV, VASC: HCPCS | Performed by: INTERNAL MEDICINE

## 2025-01-14 PROCEDURE — 96373 THER/PROPH/DIAG INJ IA: CPT | Mod: 59 | Performed by: INTERNAL MEDICINE

## 2025-01-14 PROCEDURE — 85347 COAGULATION TIME ACTIVATED: CPT | Performed by: INTERNAL MEDICINE

## 2025-01-14 PROCEDURE — RXMED WILLOW AMBULATORY MEDICATION CHARGE

## 2025-01-14 PROCEDURE — 36415 COLL VENOUS BLD VENIPUNCTURE: CPT | Performed by: INTERNAL MEDICINE

## 2025-01-14 DEVICE — STENT ONYXNG27515UX ONYX 2.75X15RX
Type: IMPLANTABLE DEVICE | Site: CHEST | Status: FUNCTIONAL
Brand: ONYX FRONTIER™

## 2025-01-14 RX ORDER — LIDOCAINE HYDROCHLORIDE 20 MG/ML
INJECTION, SOLUTION INFILTRATION; PERINEURAL AS NEEDED
Status: DISCONTINUED | OUTPATIENT
Start: 2025-01-14 | End: 2025-01-14 | Stop reason: HOSPADM

## 2025-01-14 RX ORDER — FENTANYL CITRATE 50 UG/ML
INJECTION, SOLUTION INTRAMUSCULAR; INTRAVENOUS AS NEEDED
Status: DISCONTINUED | OUTPATIENT
Start: 2025-01-14 | End: 2025-01-14 | Stop reason: HOSPADM

## 2025-01-14 RX ORDER — ROSUVASTATIN CALCIUM 40 MG/1
40 TABLET, COATED ORAL DAILY
Qty: 30 TABLET | Refills: 1 | Status: SHIPPED | OUTPATIENT
Start: 2025-01-14 | End: 2025-03-15

## 2025-01-14 RX ORDER — METOPROLOL SUCCINATE 25 MG/1
25 TABLET, EXTENDED RELEASE ORAL DAILY
Qty: 30 TABLET | Refills: 1 | Status: SHIPPED | OUTPATIENT
Start: 2025-01-14 | End: 2025-03-15

## 2025-01-14 RX ORDER — HYDRALAZINE HYDROCHLORIDE 20 MG/ML
10 INJECTION INTRAMUSCULAR; INTRAVENOUS ONCE
Status: COMPLETED | OUTPATIENT
Start: 2025-01-14 | End: 2025-01-14

## 2025-01-14 RX ORDER — NITROGLYCERIN 5 MG/ML
INJECTION, SOLUTION INTRAVENOUS AS NEEDED
Status: DISCONTINUED | OUTPATIENT
Start: 2025-01-14 | End: 2025-01-14 | Stop reason: HOSPADM

## 2025-01-14 RX ORDER — SODIUM CHLORIDE 9 MG/ML
100 INJECTION, SOLUTION INTRAVENOUS CONTINUOUS
Status: ACTIVE | OUTPATIENT
Start: 2025-01-14 | End: 2025-01-14

## 2025-01-14 RX ORDER — HEPARIN SODIUM 1000 [USP'U]/ML
INJECTION, SOLUTION INTRAVENOUS; SUBCUTANEOUS AS NEEDED
Status: DISCONTINUED | OUTPATIENT
Start: 2025-01-14 | End: 2025-01-14 | Stop reason: HOSPADM

## 2025-01-14 RX ORDER — NICARDIPINE HYDROCHLORIDE 0.2 MG/ML
INJECTION INTRAVENOUS AS NEEDED
Status: DISCONTINUED | OUTPATIENT
Start: 2025-01-14 | End: 2025-01-14 | Stop reason: HOSPADM

## 2025-01-14 RX ORDER — MIDAZOLAM HYDROCHLORIDE 1 MG/ML
INJECTION, SOLUTION INTRAMUSCULAR; INTRAVENOUS AS NEEDED
Status: DISCONTINUED | OUTPATIENT
Start: 2025-01-14 | End: 2025-01-14 | Stop reason: HOSPADM

## 2025-01-14 RX ADMIN — HYDRALAZINE HYDROCHLORIDE 10 MG: 20 INJECTION, SOLUTION INTRAMUSCULAR; INTRAVENOUS at 11:15

## 2025-01-14 RX ADMIN — SODIUM CHLORIDE 100 ML/HR: 9 INJECTION, SOLUTION INTRAVENOUS at 10:30

## 2025-01-14 ASSESSMENT — PAIN SCALES - GENERAL

## 2025-01-14 ASSESSMENT — ENCOUNTER SYMPTOMS
CONSTITUTIONAL NEGATIVE: 1
RESPIRATORY NEGATIVE: 1
PSYCHIATRIC NEGATIVE: 1
GASTROINTESTINAL NEGATIVE: 1
MUSCULOSKELETAL NEGATIVE: 1
EYES NEGATIVE: 1
ALLERGIC/IMMUNOLOGIC NEGATIVE: 1
NEUROLOGICAL NEGATIVE: 1
CARDIOVASCULAR NEGATIVE: 1
ENDOCRINE NEGATIVE: 1

## 2025-01-14 ASSESSMENT — PAIN - FUNCTIONAL ASSESSMENT

## 2025-01-14 NOTE — Clinical Note
Vessel: circumflex. Stent inserted. Inflation 1: Pressure = 12 augustine; Duration = 10 sec. Inflation 2: Pressure = 14 augustine; Duration = 14 sec. Inflation 3: Pressure = 10 augustine; .

## 2025-01-14 NOTE — NURSING NOTE
Cardiac Rehab: Patient seen for qualifying diagnosis to cardiac rehab program. Described and discussed Phase II Cardiac Rehab program with patient and family, gave [facility] Handbook and pamphlet, and discussed enrollment into Phase II. Reviewed cardiac lifestyle modifications necessary for improved heart health. Encouraged cardiology follow up post hospitalization. Discussed the importance of medication compliance. Patient will follow up with [facility] after discharge for enrollment in program.

## 2025-01-14 NOTE — Clinical Note
Angioplasty of the circumflex lesion. Inflation 1: Pressure = 12 augustine; Duration = 10 sec. Inflation 2: Pressure = 14 augustine; Duration = 10 sec. Inflation 3: Pressure = 14 augustine; Duration = 10 sec.

## 2025-01-14 NOTE — Clinical Note
Angioplasty of the ostium marginal circumflex lesion. Inflation 1: Pressure = 9 augustine; Duration = 10 sec. Inflation 2: Pressure = 9 augustine; Duration = 10 sec.

## 2025-01-14 NOTE — H&P
History Of Present Illness  Gopal Rivas is a 59 y.o. male with hx of remote CAD mild.HLD ,CKD and former smoker presenting with elective heart cath after he had a abnormal stress test he c/o some left upper shoulder pain this am, no SOB.         E Stress Test:Summary:12/2024   1. The resting ejection fraction was estimated at 45 to 50% with a peak exercise ejection fraction estimated at 60 to 65%.   2. ECG with TWI III, aVF with ST depression in II, V4-V6 - increase in ST depression and TWI with stress.   3. Abnormal Stress Test.   4. Adequate level of stress achieved.   5. ECG changes consistent with ischemia.       Past Medical History  He has a past medical history of Gastric reflux, Other specified health status, and Positive cardiac stress test.    Surgical History  He has a past surgical history that includes CT angio neck (11/23/2022).     Social History  He reports that he has quit smoking. His smoking use included cigarettes. He has never used smokeless tobacco. He reports current alcohol use of about 5.0 standard drinks of alcohol per week. He reports that he does not currently use drugs after having used the following drugs: Marijuana.    Family History  Family History   Problem Relation Name Age of Onset    Stroke Mother      Coronary artery disease Father          Allergies  Patient has no known allergies.  Scheduled medications  No current facility-administered medications on file prior to encounter.     Current Outpatient Medications on File Prior to Encounter   Medication Sig Dispense Refill    aspirin 81 mg EC tablet Take 1 tablet (81 mg) by mouth once daily. 30 tablet 11    lisinopril 5 mg tablet Take 1 tablet (5 mg) by mouth once daily. 30 tablet 11    omeprazole (PriLOSEC) 20 mg DR capsule Take 1 capsule (20 mg) by mouth once daily in the morning. Take before meals.      rosuvastatin (Crestor) 20 mg tablet Take 1 tablet (20 mg) by mouth once daily. 90 tablet 3        Review of Systems  "  Constitutional: Negative.    HENT: Negative.     Eyes: Negative.    Respiratory: Negative.     Cardiovascular: Negative.    Gastrointestinal: Negative.    Endocrine: Negative.    Genitourinary: Negative.    Musculoskeletal: Negative.    Skin: Negative.    Allergic/Immunologic: Negative.    Neurological: Negative.    Psychiatric/Behavioral: Negative.          Physical Exam  Constitutional:       Appearance: Normal appearance.   HENT:      Head: Normocephalic.   Cardiovascular:      Rate and Rhythm: Normal rate.   Pulmonary:      Effort: Pulmonary effort is normal.   Abdominal:      General: Abdomen is flat.   Musculoskeletal:         General: Normal range of motion.      Cervical back: Normal range of motion.   Skin:     General: Skin is warm.   Neurological:      Mental Status: He is oriented to person, place, and time.   Psychiatric:         Mood and Affect: Mood normal.          Last Recorded Vitals  Blood pressure (!) 152/97, pulse 58, temperature 36.6 °C (97.9 °F), temperature source Tympanic, resp. rate 16, height 1.829 m (6'), weight 96.2 kg (212 lb), SpO2 97%.    Relevant Results         Labs:     Lab Results   Component Value Date    TROPONINI <0.02 12/25/2019         Lab Results   Component Value Date    TROPHS 19 11/28/2024    TROPHS 19 11/28/2024    TROPHS 24 (H) 11/23/2022      Lab Results   Component Value Date    GLUCOSE 113 (H) 01/14/2025    CALCIUM 9.1 01/14/2025     01/14/2025    K 3.9 01/14/2025    CO2 28 01/14/2025     01/14/2025    BUN 16 01/14/2025    CREATININE 1.15 01/14/2025      Lab Results   Component Value Date    WBC 5.5 01/14/2025    HGB 14.3 01/14/2025    HCT 41.6 01/14/2025    MCV 85 01/14/2025     01/14/2025        LABS:  ABGs: No results found for: \"PH\"  PRO-BNP: No results found for: \"PROBNP\"   TSH: No results found for: \"TSH\"   Lipid Profile: No results found for: \"TRIG\", \"HDL\", \"LDLCALC\", \"CHOL\"   Hemoglobin A1C: No results found for: \"HGBA1C\"   Magnesium:  No " "results found for: \"MG\"           Assessment/Plan   Assessment & Plan  Abnormal stress ECG    Precordial pain     Abnormal stress test; A  59 y.o. male with hx of remote CAD mild.HLD ,CKD and former smoker presenting with elective heart cath after he had a abnormal stress test he c/o some left upper shoulder pain this am, no SOB.     -left heart cath with possible PCI .in light of sx and multiple risk factors,we recommend for cardiac cath to r/o coroanry artery disease and we explained in detailed to the pt and family re; bleeding,infection,stroke,MI and death,they understand the information well and agree with the plan .  I acknowledge that this center does not have an on-site open heart surgery program and that if necessary as a result of an adverse event or clinical finding requiring cardiac surgery or advanced medical care I may need to be transferred to another [facility] facility that has a cardiac surgery program in a timely manner according to the [place] Department of Health's guidelines.           Vika Joel, APRN-CNP  "

## 2025-01-14 NOTE — POST-PROCEDURE NOTE
Physician Transition of Care Summary  Invasive Cardiovascular Lab    Procedure Date: 1/14/2025  Attending:    Tamica Osorio - Primary  Resident/Fellow/Other Assistant: Surgeons and Role:  * No surgeons found with a matching role *    Indications:   Pre-op Diagnosis      * Abnormal stress ECG [R94.39]     * Precordial pain [R07.2]    Post-procedure diagnosis:   Post-op Diagnosis     * Abnormal stress ECG [R94.39]     * Precordial pain [R07.2]    Procedure(s):     * Left Heart Cath, With LV    * PCI MOIZ Stent- Coronary      Procedure Findings:   LMT normal.  LAD mild.  Lcx Om2 80-90%.  RCA 50% mid, 50% distal, tortuous.  LVEDP 5.  LVEF 55%.      Successful PCI OM2 with MOIZ X 1 2.75/15 post 2.75 NC.  RCA tortuous, unlikely accurate IFR due to wire artifact, will medical therapy and followup angina.    Description of the Procedure:   R radial 5/6 slender, heparin ACT >250.  Javier for LMT, RCA.  Pigtail for ventricle.  XB3.5 for guide, universal.      Complications:   none    Stents/Implants:   Implants       Stent    Stent, Jose A [place] Moiz, 2.75 X 15rx - [id number] - Implanted        Inventory item: STENT, JOSE A FRONTIER MOIZ, 2.75 X 15RX Model/Cat number: [id number]    : [employer] Lot number: [id number]    Device identifier: [id number]        GUDID Information       Request status Successful        Brand name: [employer]™ Version/Model: [id number]    Company name: [employer] MRI safety info as of 1/14/25: MR Conditional    Contains dry or latex rubber: No      GMDN P.T. name: Drug-eluting coronary artery stent, non-bioabsorbable-polymer-coated                As of 1/14/2025       Status: Implanted                              Anticoagulation/Antiplatelet Plan:   DAPT asa/brilinta    Estimated Blood Loss:   0 mL    Anesthesia: Moderate Sedation Anesthesia Staff: No anesthesia staff entered.    Any Specimen(s) Removed:   Order Name Source Comment Collection Info Order Time    CBC Blood, Venous  Collected By: Krystyna Adames RN 1/14/2025  6:33 AM     [facility]   Immediate        BASIC METABOLIC PANEL Blood, Venous  Collected By: Krystyna Adames RN 1/14/2025  6:33 AM     Release result to MyChart   Immediate            Disposition:   Home today on DAPT, statin, B-blocker      Electronically signed by: Frank Osorio MD, 1/14/2025 9:14 AM

## 2025-01-20 LAB
ATRIAL RATE: 56 BPM
P AXIS: 15 DEGREES
P OFFSET: 192 MS
P ONSET: 140 MS
PR INTERVAL: 158 MS
Q ONSET: 219 MS
QRS COUNT: 9 BEATS
QRS DURATION: 96 MS
QT INTERVAL: 418 MS
QTC CALCULATION(BAZETT): 403 MS
QTC FREDERICIA: 408 MS
R AXIS: 0 DEGREES
T AXIS: -36 DEGREES
T OFFSET: 428 MS
VENTRICULAR RATE: 56 BPM

## 2025-02-06 ENCOUNTER — OFFICE VISIT (OUTPATIENT)
Dept: CARDIOLOGY | Facility: CLINIC | Age: 60
End: 2025-02-06
Payer: COMMERCIAL

## 2025-02-06 VITALS
OXYGEN SATURATION: 98 % | HEIGHT: 72 IN | SYSTOLIC BLOOD PRESSURE: 130 MMHG | WEIGHT: 212 LBS | DIASTOLIC BLOOD PRESSURE: 80 MMHG | BODY MASS INDEX: 28.71 KG/M2 | HEART RATE: 51 BPM

## 2025-02-06 DIAGNOSIS — I25.10 CORONARY ARTERY DISEASE INVOLVING NATIVE CORONARY ARTERY OF NATIVE HEART WITHOUT ANGINA PECTORIS: Primary | ICD-10-CM

## 2025-02-06 DIAGNOSIS — R07.2 PRECORDIAL PAIN: ICD-10-CM

## 2025-02-06 DIAGNOSIS — I25.10 CORONARY ARTERY DISEASE INVOLVING NATIVE CORONARY ARTERY OF NATIVE HEART: ICD-10-CM

## 2025-02-06 DIAGNOSIS — R94.39 ABNORMAL STRESS ECG: ICD-10-CM

## 2025-02-06 PROCEDURE — 1036F TOBACCO NON-USER: CPT | Performed by: INTERNAL MEDICINE

## 2025-02-06 PROCEDURE — 3008F BODY MASS INDEX DOCD: CPT | Performed by: INTERNAL MEDICINE

## 2025-02-06 PROCEDURE — 99214 OFFICE O/P EST MOD 30 MIN: CPT | Performed by: INTERNAL MEDICINE

## 2025-02-06 RX ORDER — CLOPIDOGREL BISULFATE 75 MG/1
75 TABLET ORAL DAILY
Qty: 30 TABLET | Refills: 9 | Status: SHIPPED | OUTPATIENT
Start: 2025-02-06 | End: 2026-02-06

## 2025-02-06 RX ORDER — ROSUVASTATIN CALCIUM 20 MG/1
20 TABLET, COATED ORAL DAILY
Qty: 90 TABLET | Refills: 3 | Status: SHIPPED | OUTPATIENT
Start: 2025-02-06 | End: 2026-02-06

## 2025-02-06 RX ORDER — LISINOPRIL 10 MG/1
10 TABLET ORAL DAILY
Qty: 90 TABLET | Refills: 3 | Status: SHIPPED | OUTPATIENT
Start: 2025-02-06 | End: 2026-02-06

## 2025-02-06 ASSESSMENT — PAIN SCALES - GENERAL: PAINLEVEL_OUTOF10: 7

## 2025-02-06 NOTE — PROGRESS NOTES
Name : Gopal Rivas   : 1965   MRN : [id number]   ENC Date : 2025      Assessment and Plan:  Coronary artery disease status post PCI circumflex 2025: Patient is doing quite well.  Will switch Brilinta to Plavix due to cost consideration.  I think we can cut his rosuvastatin back to 20 mg daily we typically only used a 40 mg dose in patients who are PostMI.  We can also discontinue metoprolol  as he does not have any angina and we can focus on Other medications for blood pressure control.  Continue aspirin indefinitely.  Hypertension: As above discontinue metoprolol and increase lisinopril.  Dyslipidemia: As above I think 20 mg of rosuvastatin is acceptable dose.  If LDL remains high probably would add PCSK9 inhibitor instead of increasing to 40 mg.  We can recheck lipids sometime in the next 6 months.  Disp: RTO in 3 months    HPI:  Patient returns today status post PCI of his circumflex.  He tolerated the procedure quite well.  He has some mild disease in the middle portion of a very tortuous codominant RCA.  It does not appear to be hemodynamically significant and was not intervened upon.  He is doing quite well.  No complications at the radial site.  He is tolerating Brilinta well from a bleeding perspective but it is quite expensive for him.  We will switch to Plavix after there 30-day free voucher runs out.    Problem list overview:   Patient Active Problem List   Diagnosis    Atherosclerosis of native coronary artery of native heart    Blood glucose elevated    Coronary artery disease involving native coronary artery of native heart    Abnormal stress ECG    Precordial pain       Meds:   Current Outpatient Medications on File Prior to Visit   Medication Sig Dispense Refill    aspirin 81 mg EC tablet Take 1 tablet (81 mg) by mouth once daily. 30 tablet 11    omeprazole (PriLOSEC) 20 mg DR capsule Take 1 capsule (20 mg) by mouth once daily in the morning. Take before meals.       [DISCONTINUED] lisinopril 5 mg tablet Take 1 tablet (5 mg) by mouth once daily. 30 tablet 11    [DISCONTINUED] metoprolol succinate XL (Toprol-XL) 25 mg 24 hr tablet Take 1 tablet (25 mg) by mouth once daily. Do not crush or chew. 30 tablet 1    [DISCONTINUED] rosuvastatin (Crestor) 40 mg tablet Take 1 tablet (40 mg) by mouth once daily. 30 tablet 1    [DISCONTINUED] ticagrelor (Brilinta) 90 mg tablet Take 1 tablet (90 mg) by mouth 2 times a day. 60 tablet 1     No current facility-administered medications on file prior to visit.        VS:  /80 (BP Location: Right arm, Patient Position: Sitting, BP Cuff Size: Adult)   Pulse 51   Ht 1.829 m (6')   Wt 96.2 kg (212 lb)   SpO2 98%   BMI 28.75 kg/m²     Vitals reviewed.   Neck:      Vascular: No JVD.   Pulmonary:      Breath sounds: Normal breath sounds.   Cardiovascular:      Normal rate. Regular rhythm.      Murmurs: There is no murmur.      No gallop.    Edema:     Peripheral edema absent.   Abdominal:      General: Abdomen is flat.      Palpations: Abdomen is soft.   Skin:     General: Skin is warm.         ECG: No results found for this or any previous visit.   ECHO: Results for orders placed during the hospital encounter of 12/18/24    Echocardiogram Stress Test    [facility], [address] [place], [place] [address]  Tel [phone] and Fax [phone]      Exercise Stress Echo    Patient Name:      LITA ERAZO        Ordering Provider:     [id number] THOMAS GARCIA  Study Date:        12/18/2024           Reading Physician:     [id number] Tevin Morales MD  MRN/PID:           [id number]             Supervising Physician: [id number] Naman Lei MD  Accession#:        [id number]         Fellow:  Date of Birth/Age: 1965 / 59 years Fellow:  Gender:            M                    Nurse:                 Terri Hoyt RN  Admit Date:        12/18/2024           Technician:            Nettie Mireles  Admission Status:  Outpatient            Sonographer:           Jocelyne Chase  [facility]  Height:            182.88 cm            Technologist:  Weight:            95.99 kg             Additional Staff:  BSA:               2.18 m2              Encounter#:            [id number]  BMI:               28.70 kg/m2          Patient Location:      [facility]    Study Type:    ECHOCARDIOGRAM STRESS TEST  Diagnosis/ICD: Chest pain, unspecified-R07.9; Atherosclerotic heart disease of  native coronary artery with other forms of angina  pectoris-I25.118  Indication:    Chest Pain and CAD  CPT Code:      Stress Test Supervision-11035; Stress Test Interpretation-12026;  Stress Echo-45197    Falls Risk: Low: Patient has a low risk for sustaining a fall; enviromental safety interventions in place.    Study Details: Correct procedure and correct patient verified verbally.      Patient History: . 58 Y/O MALE PRESENTS TO BE EVALUATED FOR CHEST PAIN AND CAD. PMH: FORMER SMOKER, CKD, FATHER MI.  Allergies: None.      Medications: The patient's prescribed medication is OMEPRAZOLE, ROSUVASTATIN. The patient took medications as prescribed.    Patient Performance: The patient exercised to stage IV on a Lencho protocol for 9 minutes and 35 seconds, achieving 11.00 METS. The peak heart rate achieved was 164 bpm, which was 102 % of the age predicted target heart rate of 161 bpm. The resting blood pressure was 165/96 mmHg with a heart rate of 63 bpm. The standing blood pressure was 179/103 mmHg with a heart rate of 63 bpm. The patient's functional capacity was average. The patient developed shortness of breath, fatigue and leg fatigue during the stress exam. The symptoms resolved with rest. The blood pressure response was normal. The test was terminated due to: fatigue, dyspnea and leg fatigue and musculoskeletal weakness. Patient has met the discharge criteria and is discharged to home.    Baseline ECG: Resting ECG showed normal sinus rhythm with ST and T wave abnormality.    Stress  ECG: Stress ECG showed sinus tachycardia, with ST depression, occ PVC.    Stress Stage Data:  +---+------+-------+------------------------------------+  HR Sys BPDias BPComments                              +---+------+-------+------------------------------------+  63 165   96                                           +---+------+-------+------------------------------------+  63 179   103                                          +---+------+-------+------------------------------------+                  c/o left chest ache at baseline 2/10  +---+------+-------+------------------------------------+  [id number]   100    denies sx                             +---+------+-------+------------------------------------+  [id number]   98     left chest ache at 3/10 and some sob  +---+------+-------+------------------------------------+  [id number]   100                                          +---+------+-------+------------------------------------+  164             sob and cp now 5/10                   +---+------+-------+------------------------------------+      Recovery ECG: Recovery ECG showed normal sinus rhythm, with ST depression. The heart rate recovery was normal.    +------------+---+------+-------+------------------------+              HR Sys BPDias BPComments                  +------------+---+------+-------+------------------------+  Recovery I  116             sx resolving, cp at 1/10  +------------+---+------+-------+------------------------+  Recovery II [id number]   88                               +------------+---+------+-------+------------------------+  Recovery III85 202   95                               +------------+---+------+-------+------------------------+  Recovery IV 84 176   119                              +------------+---+------+-------+------------------------+  Recovery V  82 199   106    baseline                   +------------+---+------+-------+------------------------+      Baseline Echo: The resting baseline ejection fraction was estimated at 45 to 50%.    Stress Echo: The ejection fraction is approximately 60 to 65% at peak stress.    Summary:  1. The resting ejection fraction was estimated at 45 to 50% with a peak exercise ejection fraction estimated at 60 to 65%.  2. ECG with TWI III, aVF with ST depression in II, V4-V6 - increase in ST depression and TWI with stress.  3. Abnormal Stress Test.  4. Adequate level of stress achieved.  5. ECG changes consistent with ischemia.    [id number] Tevin Morales MD  Electronically signed on 12/18/2024 at 3:41:29 PM              ** Final **     CT Results:  CT angio chest for pulmonary embolism 11/29/2024    Narrative  Interpreted By:  Fred Murrell,  STUDY:  CT ANGIO CHEST FOR PULMONARY EMBOLISM;  11/29/2024 12:14 am    INDICATION:  Signs/Symptoms:chest pain elevated dimer.      COMPARISON:  None    ACCESSION NUMBER(S):  [id number]    ORDERING CLINICIAN:  APOLINAR HOPKINS    TECHNIQUE:  Helical data acquisition of the chest was obtained after intravenous  administration of 75 ML Omnipaque 350, as per PE protocol. Images  were reformatted in coronal and sagittal planes. Axial and coronal  maximum intensity projection (MIP) images were created and reviewed.    FINDINGS:  POTENTIAL LIMITATIONS OF THE STUDY: None    HEART AND VESSELS:  No discrete filling defects within the main pulmonary artery or its  branches to segmental level. Please note that, assessment of  subsegmental branches is limited and small peripheral emboli are not  entirely excluded. Main pulmonary artery and its branches are normal  in caliber.    The thoracic aorta normal in course and caliber.There is mild  scattered atherosclerosis present, including calcified and  noncalcified plaques. Moderate coronary artery calcifications are  seen. Please note,the study is not optimized for evaluation of  coronary  arteries.    Mildly enlarged.    There is no pericardial effusion seen.    MEDIASTINUM AND MELISA, LOWER NECK AND AXILLA:  The visualized thyroid gland is within normal limits.  No pathologically enlarged lymph nodes.  Circumferential submucosal edema in the distal esophagus suggesting  esophagitis. Small sliding hiatal hernia    LUNGS AND AIRWAYS:  The trachea and central airways are patent. No endobronchial lesion  is seen. Saber sheath trachea incidentally noted.    The bilateral lungs are clear without evidence of focal  consolidation, pleural effusion, or pneumothorax. Mild emphysema.      UPPER ABDOMEN:  The visualized subdiaphragmatic structures demonstrate no acute  findings. Partially imaged large exophytic right upper pole renal  cortical cyst measuring up to 10.8 cm in transaxial diameter.        CHEST WALL AND OSSEOUS STRUCTURES:  Chest wall is within normal limits.  No acute osseous pathology.There are no suspicious osseous lesions.  Mild-to-moderate discogenic degeneration of the thoracic spine.    Impression  No acute pulmonary embolus to the segmental level. No evidence of  acute pathology in the chest.    Mild emphysema.    Mild cardiomegaly.    Circumferential submucosal edema in the distal esophagus suggesting  esophagitis.    Atherosclerosis, including coronary artery disease.    Additional findings as discussed above.    MACRO:  None    Signed by: Fred Murrell 11/29/2024 12:20 AM  Dictation workstation:   [id number]      Juan Lutz MD

## 2025-08-07 ENCOUNTER — APPOINTMENT (OUTPATIENT)
Dept: CARDIOLOGY | Facility: CLINIC | Age: 60
End: 2025-08-07
Payer: COMMERCIAL

## 2025-08-11 ENCOUNTER — APPOINTMENT (OUTPATIENT)
Dept: CARDIOLOGY | Facility: CLINIC | Age: 60
End: 2025-08-11
Payer: COMMERCIAL

## 2025-08-12 ENCOUNTER — APPOINTMENT (OUTPATIENT)
Dept: CARDIOLOGY | Facility: CLINIC | Age: 60
End: 2025-08-12
Payer: COMMERCIAL

## 2025-08-18 ENCOUNTER — OFFICE VISIT (OUTPATIENT)
Dept: CARDIOLOGY | Facility: CLINIC | Age: 60
End: 2025-08-18
Payer: COMMERCIAL

## 2025-08-18 VITALS
HEIGHT: 72 IN | OXYGEN SATURATION: 97 % | BODY MASS INDEX: 28.39 KG/M2 | WEIGHT: 209.6 LBS | HEART RATE: 57 BPM | DIASTOLIC BLOOD PRESSURE: 86 MMHG | SYSTOLIC BLOOD PRESSURE: 126 MMHG

## 2025-08-18 DIAGNOSIS — I25.10 CORONARY ARTERY DISEASE INVOLVING NATIVE CORONARY ARTERY OF NATIVE HEART WITHOUT ANGINA PECTORIS: ICD-10-CM

## 2025-08-18 DIAGNOSIS — R07.2 PRECORDIAL PAIN: ICD-10-CM

## 2025-08-18 DIAGNOSIS — I25.10 CORONARY ARTERY DISEASE INVOLVING NATIVE CORONARY ARTERY OF NATIVE HEART: ICD-10-CM

## 2025-08-18 DIAGNOSIS — N52.9 ERECTILE DYSFUNCTION, UNSPECIFIED ERECTILE DYSFUNCTION TYPE: Primary | ICD-10-CM

## 2025-08-18 DIAGNOSIS — R94.39 ABNORMAL STRESS ECG: ICD-10-CM

## 2025-08-18 PROCEDURE — 99214 OFFICE O/P EST MOD 30 MIN: CPT | Performed by: INTERNAL MEDICINE

## 2025-08-18 PROCEDURE — 99212 OFFICE O/P EST SF 10 MIN: CPT

## 2025-08-18 PROCEDURE — 3008F BODY MASS INDEX DOCD: CPT | Performed by: INTERNAL MEDICINE

## 2025-08-18 RX ORDER — LISINOPRIL 30 MG/1
30 TABLET ORAL DAILY
Start: 2025-08-18 | End: 2026-08-18

## 2025-08-18 RX ORDER — SILDENAFIL 50 MG/1
50 TABLET, FILM COATED ORAL DAILY PRN
Qty: 10 TABLET | Refills: 0 | Status: SHIPPED | OUTPATIENT
Start: 2025-08-18 | End: 2025-09-17

## (undated) DEVICE — CATHETER, BALLOON, NC EUPHORA NONCOMPLIANT 2.75 X 12 X 142CM

## (undated) DEVICE — TR BAND, RADIAL COMPRESSION, STANDARD, 24CM

## (undated) DEVICE — CATHETER, OPTITORQUE, 5FR, JACKY, 3.5/ 2H/110CM, CURVED

## (undated) DEVICE — CATHETER, BALLOON DILATION, EUPHORA SEMICOMPLIANT 2.50  X 12 MM X 142CM

## (undated) DEVICE — 50ML IODIXANOL INJECTION (NDC 65219-383-05)

## (undated) DEVICE — VALVE, CONTROL BLEEDBACK

## (undated) DEVICE — SHEATH, GLIDESHEATH, SLENDER, 6FR 10CM

## (undated) DEVICE — MBRACE, WRIST SUPPORT WITH HOOK

## (undated) DEVICE — MANIFOLD KIT, CUSTOM (SJM)

## (undated) DEVICE — Device

## (undated) DEVICE — INFLATION DEVICE, BASIXCOMPAX, 30 ATM/BAR, 20ML, MAP152, 12IN TUBING

## (undated) DEVICE — CATHETER, DIAGNOSTIC, 5FR,  PIG-145, 110CM, 6SH ANGLED

## (undated) DEVICE — CATHETER, VISTA BRITE TIP GUIDE, 6FR 0.070 XB 3.5 ECO

## (undated) DEVICE — GUIDEWIRE, UNIVERSAL BALANCE MID WEIGHT

## (undated) DEVICE — SYRINGE, VACLOK, 20ML, FIXED MALE LUER